# Patient Record
Sex: MALE | Employment: FULL TIME | URBAN - METROPOLITAN AREA
[De-identification: names, ages, dates, MRNs, and addresses within clinical notes are randomized per-mention and may not be internally consistent; named-entity substitution may affect disease eponyms.]

---

## 2018-01-22 ENCOUNTER — PATIENT MESSAGE (OUTPATIENT)
Dept: FAMILY MEDICINE CLINIC | Facility: CLINIC | Age: 48
End: 2018-01-22

## 2018-01-24 NOTE — TELEPHONE ENCOUNTER
CMW please advise on my chart message:     From: Cyril Wiggins  To:  Lio Nichole DO  Sent: 1/22/2018 10:43 AM CST  Subject: Non-Urgent Medical Question    I scheduled a checkup with Dr. Claritza Otto, which I could not get in until Mar. 1.      Aside from a

## 2018-01-25 ENCOUNTER — OFFICE VISIT (OUTPATIENT)
Dept: FAMILY MEDICINE CLINIC | Facility: CLINIC | Age: 48
End: 2018-01-25

## 2018-01-25 VITALS
TEMPERATURE: 98 F | DIASTOLIC BLOOD PRESSURE: 75 MMHG | HEART RATE: 56 BPM | WEIGHT: 201 LBS | SYSTOLIC BLOOD PRESSURE: 130 MMHG | BODY MASS INDEX: 25.8 KG/M2 | HEIGHT: 74 IN | RESPIRATION RATE: 16 BRPM

## 2018-01-25 DIAGNOSIS — Z00.00 ROUTINE PHYSICAL EXAMINATION: Primary | ICD-10-CM

## 2018-01-25 DIAGNOSIS — R42 VERTIGO: ICD-10-CM

## 2018-01-25 DIAGNOSIS — R06.83 SNORING: ICD-10-CM

## 2018-01-25 DIAGNOSIS — M21.612 BUNION OF LEFT FOOT: ICD-10-CM

## 2018-01-25 PROCEDURE — 99396 PREV VISIT EST AGE 40-64: CPT | Performed by: FAMILY MEDICINE

## 2018-01-25 PROCEDURE — 90715 TDAP VACCINE 7 YRS/> IM: CPT | Performed by: FAMILY MEDICINE

## 2018-01-25 PROCEDURE — 90471 IMMUNIZATION ADMIN: CPT | Performed by: FAMILY MEDICINE

## 2018-01-25 RX ORDER — CLONAZEPAM 0.5 MG/1
0.5 TABLET ORAL 2 TIMES DAILY PRN
COMMUNITY
End: 2019-06-27

## 2018-01-25 RX ORDER — ROSUVASTATIN CALCIUM 5 MG/1
5 TABLET, COATED ORAL
Refills: 3 | COMMUNITY
Start: 2018-01-12

## 2018-01-25 NOTE — PROGRESS NOTES
HPI:  52 yr old male who presents for physical. Overall, feeling well. Sees cardiologist for father's history of MI at 48. Started Crestor 5mg nightly. Last bloodwork was 8/17. Lipid panel was good as well as LFTs.       Pt was a smoker in the past.  Smok Ht 6' 2\" (1.88 m)   Wt 201 lb (91.2 kg)   BMI 25.81 kg/m²   Gen:  Well-nourished. No distress. HEENT: PERRLA, conjunctive clear. Keshav ear canals clear. Keshav TMs intact with good landmarks noted. Nares patent.   Oral mucous membrane moist.  Normal lips,

## 2019-06-27 ENCOUNTER — OFFICE VISIT (OUTPATIENT)
Dept: FAMILY MEDICINE CLINIC | Facility: CLINIC | Age: 49
End: 2019-06-27
Payer: COMMERCIAL

## 2019-06-27 VITALS
RESPIRATION RATE: 16 BRPM | HEIGHT: 74 IN | DIASTOLIC BLOOD PRESSURE: 77 MMHG | BODY MASS INDEX: 25.93 KG/M2 | HEART RATE: 56 BPM | TEMPERATURE: 98 F | SYSTOLIC BLOOD PRESSURE: 125 MMHG | WEIGHT: 202 LBS

## 2019-06-27 DIAGNOSIS — Z00.00 ROUTINE PHYSICAL EXAMINATION: Primary | ICD-10-CM

## 2019-06-27 DIAGNOSIS — Z12.11 SCREENING FOR COLON CANCER: ICD-10-CM

## 2019-06-27 PROCEDURE — 99396 PREV VISIT EST AGE 40-64: CPT | Performed by: FAMILY MEDICINE

## 2019-06-27 RX ORDER — LORAZEPAM 0.5 MG/1
0.5 TABLET ORAL EVERY 4 HOURS PRN
COMMUNITY

## 2019-06-27 NOTE — PROGRESS NOTES
HPI:  52 yr old male who presents for physical. Feeling well. Plays hockey twice a week. Works out other day. Follows with Dr. Roxy Stauffer for cardiology. Father had MI at 48. Had normal stress test. Takes Rosuvastatin twice per week.  Cholesterol is stab canals clear. Keshav TMs intact with good landmarks noted. Nares patent. Oral mucous membrane moist.  Normal lips, teeth, and gums. Oropharynx normal.  Neck supple. Good ROM. No LAD.   Thyroid normal.  CV:  Regular rate and rhythm; no murmurs  Lungs:  Cl

## 2019-08-22 ENCOUNTER — OFFICE VISIT (OUTPATIENT)
Dept: GASTROENTEROLOGY | Facility: CLINIC | Age: 49
End: 2019-08-22
Payer: COMMERCIAL

## 2019-08-22 ENCOUNTER — TELEPHONE (OUTPATIENT)
Dept: GASTROENTEROLOGY | Facility: CLINIC | Age: 49
End: 2019-08-22

## 2019-08-22 VITALS
HEIGHT: 74 IN | DIASTOLIC BLOOD PRESSURE: 73 MMHG | HEART RATE: 60 BPM | WEIGHT: 202 LBS | SYSTOLIC BLOOD PRESSURE: 120 MMHG | BODY MASS INDEX: 25.93 KG/M2

## 2019-08-22 DIAGNOSIS — Z12.11 COLON CANCER SCREENING: Primary | ICD-10-CM

## 2019-08-22 DIAGNOSIS — L29.0 ANAL PRURITUS: Primary | ICD-10-CM

## 2019-08-22 DIAGNOSIS — Z83.71 FAMILY HX COLONIC POLYPS: ICD-10-CM

## 2019-08-22 PROCEDURE — 99243 OFF/OP CNSLTJ NEW/EST LOW 30: CPT | Performed by: INTERNAL MEDICINE

## 2019-08-22 RX ORDER — FLUOCINOLONE ACETONIDE 0.11 MG/ML
OIL AURICULAR (OTIC)
COMMUNITY

## 2019-08-22 NOTE — PATIENT INSTRUCTIONS
1. Start with preparation H --- twice a day for 14 days  2. If preparation H doesn't work, start with Desitin ointment 1-2x a day for 14 days  3. Schedule colonoscopy with IV sedation (Screening, family hx of colon polyps)  4.  Suprep prescription given

## 2019-08-22 NOTE — TELEPHONE ENCOUNTER
Scheduled for:  Colonoscopy 41855  Provider Name: Dr Mauricio Seals  Date:  Wed 10/23/19  Location:  [facility]  Sedation: IV  Time:  7:30 am  Prep: split dose suprep  Meds/Allergies Reconciled?:  PCN  Diagnosis with codes:  FHCP Z83.71, colon cancer screening Z12.11  Was

## 2019-08-27 PROBLEM — L29.0 ANAL PRURITUS: Status: ACTIVE | Noted: 2019-08-27

## 2019-08-27 NOTE — H&P
[address] [facility] [address] Gastroenterology                                                                                                  Clinic History and Physical     Pa Take as directed Disp: 1 Bottle Rfl: 0   LORazepam 0.5 MG Oral Tab Take 0.5 mg by mouth every 4 (four) hours as needed for Anxiety. Disp:  Rfl:    Rosuvastatin Calcium 5 MG Oral Tab Take 5 mg by mouth twice a week.  Disp:  Rfl: 3   Cholecalciferol (VITAMIN cancer screening evaluation. NO anemia noted on lab work. # Average Risk screening: patient is considered average risk for colon cancer (NO family hx of colon cancer) and it is appropriate to proceed with screening colonoscopy.  Patient is currently as with intervention [i.e. polypectomy, stent placement, etc.] as indicated. Orders This Visit:  No orders of the defined types were placed in this encounter.       Meds This Visit:  Requested Prescriptions     Signed Prescriptions Disp Refills   • Liane Beal

## 2019-10-23 ENCOUNTER — HOSPITAL ENCOUNTER (OUTPATIENT)
Facility: HOSPITAL | Age: 49
Setting detail: HOSPITAL OUTPATIENT SURGERY
Discharge: HOME | End: 2019-10-23
Attending: INTERNAL MEDICINE | Admitting: INTERNAL MEDICINE
Payer: COMMERCIAL

## 2019-10-23 VITALS
OXYGEN SATURATION: 99 % | SYSTOLIC BLOOD PRESSURE: 125 MMHG | HEIGHT: 74 IN | DIASTOLIC BLOOD PRESSURE: 80 MMHG | BODY MASS INDEX: 25.41 KG/M2 | HEART RATE: 67 BPM | RESPIRATION RATE: 12 BRPM | WEIGHT: 198 LBS

## 2019-10-23 DIAGNOSIS — Z83.71 FAMILY HX COLONIC POLYPS: ICD-10-CM

## 2019-10-23 DIAGNOSIS — Z12.11 COLON CANCER SCREENING: ICD-10-CM

## 2019-10-23 PROCEDURE — 0DBL8ZX EXCISION OF TRANSVERSE COLON, VIA NATURAL OR ARTIFICIAL OPENING ENDOSCOPIC, DIAGNOSTIC: ICD-10-PCS | Performed by: INTERNAL MEDICINE

## 2019-10-23 PROCEDURE — 45385 COLONOSCOPY W/LESION REMOVAL: CPT | Performed by: INTERNAL MEDICINE

## 2019-10-23 PROCEDURE — G0500 MOD SEDAT ENDO SERVICE >5YRS: HCPCS | Performed by: INTERNAL MEDICINE

## 2019-10-23 RX ORDER — MIDAZOLAM HYDROCHLORIDE 1 MG/ML
1 INJECTION INTRAMUSCULAR; INTRAVENOUS EVERY 5 MIN PRN
Status: DISCONTINUED | OUTPATIENT
Start: 2019-10-23 | End: 2019-10-23

## 2019-10-23 RX ORDER — SODIUM CHLORIDE, SODIUM LACTATE, POTASSIUM CHLORIDE, CALCIUM CHLORIDE 600; 310; 30; 20 MG/100ML; MG/100ML; MG/100ML; MG/100ML
INJECTION, SOLUTION INTRAVENOUS CONTINUOUS
Status: DISCONTINUED | OUTPATIENT
Start: 2019-10-23 | End: 2019-10-23

## 2019-10-23 RX ORDER — SODIUM CHLORIDE 0.9 % (FLUSH) 0.9 %
10 SYRINGE (ML) INJECTION AS NEEDED
Status: DISCONTINUED | OUTPATIENT
Start: 2019-10-23 | End: 2019-10-23

## 2019-10-23 RX ORDER — MIDAZOLAM HYDROCHLORIDE 1 MG/ML
INJECTION INTRAMUSCULAR; INTRAVENOUS
Status: DISCONTINUED | OUTPATIENT
Start: 2019-10-23 | End: 2019-10-23

## 2019-10-23 NOTE — OPERATIVE REPORT
COLONOSCOPY REPORT    Malcolm Angelucci     3/11/1970 Age 52year old   PCP Calderon Estrada DO Endoscopist Isabel Aponte MD     Date of procedure: 10/23/19    Procedure: Colonoscopy w/cold snare polypectomy    Pre-operative diagnosis: Screening retrieved. B. 15 mm polyp in the proximal transverse colon; flat morphology; cold snare polypectomy and retrieved. C. 10 mm polyp in the proximal transverse colon; flat morphology; cold snare polypectomy and retrieved.   >>Visualization was diffic

## 2019-10-23 NOTE — H&P
History & Physical Examination    Patient Name: Simone Reaves  MRN: [id number]  [facility]: [id number]  YOB: 1970    Diagnosis: screening for colon cancer    LORazepam 0.5 MG Oral Tab, Take 0.5 mg by mouth every 4 (four) hours as needed for Anxiety Normal Check if Physical Exam is Normal If not normal, please explain:   HEENT Emma.Blowers ] [ Harley Turcios  Emma.Blowers ] [ X]    HEART Emma.Blowers ] [ Doyle Lloyd Emma.Blowers ] [ Maryann Doradoted Emma.Blowers ] [ Elvira Mancia Emma.Blowers ] [ X]    OTHER        I have discussed the risks and benefits and alte

## 2019-10-30 ENCOUNTER — TELEPHONE (OUTPATIENT)
Dept: GASTROENTEROLOGY | Facility: CLINIC | Age: 49
End: 2019-10-30

## 2019-10-30 NOTE — TELEPHONE ENCOUNTER
I mailed out colonoscopy results letter to pt  Updated health maintenance  Entered into 1 y r CLN recall  Recall colon in 1 year by .   Colon done 10/23/19    GI staff: please place recall for colonoscopy in 1 year

## 2020-08-25 ENCOUNTER — TELEPHONE (OUTPATIENT)
Dept: GASTROENTEROLOGY | Facility: CLINIC | Age: 50
End: 2020-08-25

## 2020-08-25 NOTE — TELEPHONE ENCOUNTER
----- Message from Guerline Regalado CMA sent at 10/30/2019 10:55 AM CDT -----  Regardin yr CLN recall w/Dr Mauricio Seals  Recall colon in 1 year by .   Colon done 10/23/19 w/Dr Mauricio Seals    GI staff: please place recall for colonoscopy in 1 year

## 2020-10-01 ENCOUNTER — TELEPHONE (OUTPATIENT)
Dept: GASTROENTEROLOGY | Facility: CLINIC | Age: 50
End: 2020-10-01

## 2020-10-01 DIAGNOSIS — Z86.010 PERSONAL HISTORY OF COLONIC POLYPS: ICD-10-CM

## 2020-10-01 DIAGNOSIS — Z12.11 COLON CANCER SCREENING: Primary | ICD-10-CM

## 2020-10-01 NOTE — TELEPHONE ENCOUNTER
Last Procedure, Date, MD:  MARY with Dr. Alma Mitchell on 10/23/2019  Last Diagnosis:  Post-operative diagnosis: Polyps and internal hemorrhoids  Recalled for (mth/yrs): 1 yr-10/23/2020  Sedation used previously: IVCS   Last Prep Used (if known):  Split dose suprep

## 2020-10-02 NOTE — TELEPHONE ENCOUNTER
Please schedule: CLN with MAC or IV sedation. Please pend split Trilyte bowel prep.      Diagnosis: screening, hx of polys    Medication adjustments:  Day before procedure, hold: none  Day of procedure, hold: none

## 2020-10-02 NOTE — TELEPHONE ENCOUNTER
Patient is calling in to schedule the procedure. He is trying to set up the procedure as soon as possible.  Please advise thank you

## 2020-10-05 RX ORDER — SODIUM, POTASSIUM,MAG SULFATES 17.5-3.13G
SOLUTION, RECONSTITUTED, ORAL ORAL
Qty: 1 BOTTLE | Refills: 0 | Status: ON HOLD | OUTPATIENT
Start: 2020-10-05 | End: 2020-12-02

## 2020-10-05 NOTE — TELEPHONE ENCOUNTER
Scheduled for:  Colonoscopy - 66785  Provider Name:  Dr. Ramsey Prabhakar  Date:  12/2/20  Location:  [facility]  Sedation:  MAC  Time:  8:15 am (pt is aware to arrive at 7:15 am)  Prep:  Suprep, Prep instructions were given to pt over the phone, pt verbalized understanding

## 2020-10-05 NOTE — TELEPHONE ENCOUNTER
Dr. Mick Mcintosh - please place order for pt's Suprep, that is the prep he would prefer. Thank you!

## 2020-11-29 ENCOUNTER — APPOINTMENT (OUTPATIENT)
Dept: LAB | Facility: HOSPITAL | Age: 50
End: 2020-11-29
Attending: INTERNAL MEDICINE
Payer: COMMERCIAL

## 2020-11-29 DIAGNOSIS — Z01.818 PRE-OP TESTING: ICD-10-CM

## 2020-12-02 ENCOUNTER — ANESTHESIA EVENT (OUTPATIENT)
Dept: ENDOSCOPY | Facility: HOSPITAL | Age: 50
End: 2020-12-02
Payer: COMMERCIAL

## 2020-12-02 ENCOUNTER — ANESTHESIA (OUTPATIENT)
Dept: ENDOSCOPY | Facility: HOSPITAL | Age: 50
End: 2020-12-02
Payer: COMMERCIAL

## 2020-12-02 ENCOUNTER — HOSPITAL ENCOUNTER (OUTPATIENT)
Facility: HOSPITAL | Age: 50
Setting detail: HOSPITAL OUTPATIENT SURGERY
Discharge: HOME | End: 2020-12-02
Attending: INTERNAL MEDICINE | Admitting: INTERNAL MEDICINE
Payer: COMMERCIAL

## 2020-12-02 VITALS
WEIGHT: 198 LBS | TEMPERATURE: 98 F | BODY MASS INDEX: 25.41 KG/M2 | SYSTOLIC BLOOD PRESSURE: 131 MMHG | RESPIRATION RATE: 16 BRPM | HEIGHT: 74 IN | DIASTOLIC BLOOD PRESSURE: 82 MMHG | OXYGEN SATURATION: 100 % | HEART RATE: 65 BPM

## 2020-12-02 DIAGNOSIS — Z01.818 PRE-OP TESTING: Primary | ICD-10-CM

## 2020-12-02 DIAGNOSIS — Z12.11 COLON CANCER SCREENING: ICD-10-CM

## 2020-12-02 DIAGNOSIS — Z86.010 PERSONAL HISTORY OF COLONIC POLYPS: ICD-10-CM

## 2020-12-02 PROCEDURE — 0DBL8ZX EXCISION OF TRANSVERSE COLON, VIA NATURAL OR ARTIFICIAL OPENING ENDOSCOPIC, DIAGNOSTIC: ICD-10-PCS | Performed by: INTERNAL MEDICINE

## 2020-12-02 PROCEDURE — 0DBK8ZX EXCISION OF ASCENDING COLON, VIA NATURAL OR ARTIFICIAL OPENING ENDOSCOPIC, DIAGNOSTIC: ICD-10-PCS | Performed by: INTERNAL MEDICINE

## 2020-12-02 PROCEDURE — 45385 COLONOSCOPY W/LESION REMOVAL: CPT | Performed by: INTERNAL MEDICINE

## 2020-12-02 RX ORDER — NALOXONE HYDROCHLORIDE 0.4 MG/ML
80 INJECTION, SOLUTION INTRAMUSCULAR; INTRAVENOUS; SUBCUTANEOUS AS NEEDED
Status: CANCELLED | OUTPATIENT
Start: 2020-12-02 | End: 2020-12-02

## 2020-12-02 RX ORDER — SODIUM CHLORIDE, SODIUM LACTATE, POTASSIUM CHLORIDE, CALCIUM CHLORIDE 600; 310; 30; 20 MG/100ML; MG/100ML; MG/100ML; MG/100ML
INJECTION, SOLUTION INTRAVENOUS CONTINUOUS
Status: CANCELLED | OUTPATIENT
Start: 2020-12-02

## 2020-12-02 RX ORDER — SODIUM CHLORIDE, SODIUM LACTATE, POTASSIUM CHLORIDE, CALCIUM CHLORIDE 600; 310; 30; 20 MG/100ML; MG/100ML; MG/100ML; MG/100ML
INJECTION, SOLUTION INTRAVENOUS CONTINUOUS
Status: DISCONTINUED | OUTPATIENT
Start: 2020-12-02 | End: 2020-12-02

## 2020-12-02 RX ORDER — LIDOCAINE HYDROCHLORIDE 10 MG/ML
INJECTION, SOLUTION EPIDURAL; INFILTRATION; INTRACAUDAL; PERINEURAL AS NEEDED
Status: DISCONTINUED | OUTPATIENT
Start: 2020-12-02 | End: 2020-12-02 | Stop reason: SURG

## 2020-12-02 RX ADMIN — LIDOCAINE HYDROCHLORIDE 50 MG: 10 INJECTION, SOLUTION EPIDURAL; INFILTRATION; INTRACAUDAL; PERINEURAL at 08:14:00

## 2020-12-02 RX ADMIN — SODIUM CHLORIDE, SODIUM LACTATE, POTASSIUM CHLORIDE, CALCIUM CHLORIDE: 600; 310; 30; 20 INJECTION, SOLUTION INTRAVENOUS at 08:09:00

## 2020-12-02 RX ADMIN — SODIUM CHLORIDE, SODIUM LACTATE, POTASSIUM CHLORIDE, CALCIUM CHLORIDE: 600; 310; 30; 20 INJECTION, SOLUTION INTRAVENOUS at 08:58:00

## 2020-12-02 NOTE — H&P
History & Physical Examination    Patient Name: Marilyn Govea  MRN: [id number]  CSN: [id number]  YOB: 1970    Diagnosis: screening for colon cancer      •  Fluocinolone Acetonide 0.01 % Otic Oil, fluocinolone acetonide oil 0.01 % ear drop patient/family. They understand and agree to proceed with plan of care. I have reviewed the History and Physical done within the last 30 days. Any changes noted above.     Teo Momin, [address] [place] [address] - Gastroenterology  12/2/2020  8:14 AM

## 2020-12-02 NOTE — ANESTHESIA POSTPROCEDURE EVALUATION
Patient: Diane Ramirez    Procedure Summary     Date: 12/02/20 Room / Location: [facility] ENDOSCOPY 01 / [facility] ENDOSCOPY    Anesthesia Start: 0809 Anesthesia Stop: 3513    Procedure: COLONOSCOPY (N/A ) Diagnosis:       Colon cancer screening      Personal hist

## 2020-12-02 NOTE — OPERATIVE REPORT
COLONOSCOPY REPORT    Tong Montero     3/11/1970 Age 48year old   PCP Héctor Murdock DO Endoscopist Isabel Gamez MD     Date of procedure: 20    Procedure: Colonoscopy w/cold snare polypectomy    Pre-operative diagnosis: Screening visualized mucosa appeared normal.    4. A retroflexed view of the rectum revealed small internal hemorrhoids. 5. The colonic mucosa throughout the colon showed normal vascular pattern, without evidence of angioectasias or inflammation. Spastic colon.

## 2020-12-02 NOTE — ANESTHESIA PREPROCEDURE EVALUATION
Anesthesia PreOp Note    HPI:     Ramos Wright is a 48year old male who presents for preoperative consultation requested by: Gamaliel Hurtado MD    Date of Surgery: 12/2/2020    Procedure(s):  COLONOSCOPY  Indication: Colon cancer screening , Persona on file      Number of children: Not on file      Years of education: Not on file      Highest education level: Not on file    Occupational History      Not on file    Social Needs      Financial resource strain: Not on file      Food insecurity        Wor file      Available pre-op labs reviewed. Vital Signs: There is no height or weight on file to calculate BMI.   vitals were not taken for this visit. There were no vitals filed for this visit.      Anesthesia Evaluation     Patient summary re

## 2020-12-15 ENCOUNTER — TELEPHONE (OUTPATIENT)
Dept: GASTROENTEROLOGY | Facility: CLINIC | Age: 50
End: 2020-12-15

## 2020-12-15 NOTE — TELEPHONE ENCOUNTER
I mailed out colonoscopy results letter to pt  Updated health maintenance  Entered into 3 yr CLN recall  Recall colon in 3 years per.  Colon done 12/02/2020    Natalia Cao MD  [facility] Clinical Staff             GI staff: please place recall for colonos

## 2023-02-11 ENCOUNTER — OFFICE VISIT (OUTPATIENT)
Dept: FAMILY MEDICINE CLINIC | Facility: CLINIC | Age: 53
End: 2023-02-11

## 2023-02-11 VITALS
BODY MASS INDEX: 24.51 KG/M2 | HEIGHT: 74 IN | HEART RATE: 79 BPM | DIASTOLIC BLOOD PRESSURE: 75 MMHG | WEIGHT: 191 LBS | TEMPERATURE: 98 F | SYSTOLIC BLOOD PRESSURE: 114 MMHG

## 2023-02-11 DIAGNOSIS — M79.645 PAIN OF FINGER OF LEFT HAND: ICD-10-CM

## 2023-02-11 DIAGNOSIS — F41.9 ANXIETY: ICD-10-CM

## 2023-02-11 DIAGNOSIS — H61.23 BILATERAL IMPACTED CERUMEN: ICD-10-CM

## 2023-02-11 DIAGNOSIS — Z00.00 ROUTINE PHYSICAL EXAMINATION: Primary | ICD-10-CM

## 2023-02-11 RX ORDER — ESCITALOPRAM OXALATE 5 MG/1
5 TABLET ORAL DAILY
COMMUNITY

## 2023-02-11 RX ORDER — FLUOCINOLONE ACETONIDE 0.11 MG/ML
1 OIL AURICULAR (OTIC) 2 TIMES DAILY
Qty: 1 EACH | Refills: 3 | Status: SHIPPED | OUTPATIENT
Start: 2023-02-11

## 2023-02-20 ENCOUNTER — OFFICE VISIT (OUTPATIENT)
Dept: OTOLARYNGOLOGY | Facility: CLINIC | Age: 53
End: 2023-02-20

## 2023-02-20 DIAGNOSIS — H61.23 BILATERAL IMPACTED CERUMEN: Primary | ICD-10-CM

## 2023-02-20 PROCEDURE — 99202 OFFICE O/P NEW SF 15 MIN: CPT | Performed by: OTOLARYNGOLOGY

## 2023-06-01 ENCOUNTER — PATIENT MESSAGE (OUTPATIENT)
Dept: FAMILY MEDICINE CLINIC | Facility: CLINIC | Age: 53
End: 2023-06-01

## 2023-06-01 DIAGNOSIS — Z12.5 PROSTATE CANCER SCREENING: Primary | ICD-10-CM

## 2023-06-02 NOTE — TELEPHONE ENCOUNTER
Dr. Kasandra Mcnamara, please see patient's MyChart message below and advise on PSA screening (pended for review) and any other recommended lab tests. Thank you. Per progress note by Dr. Kasandra Mcnamara 02/11/23:  \"A/P:  Routine physical examination  (primary encounter diagnosis)  Healthy. Exercising regularly. Follows with cardiologist.  Had normal stress and echo this year. We will plan to see cardiology again in the fall. May need lab work before then. We will reach out to me to order lab work for cardiologist if he needs it. Patient is also considering whether or not he wants to get PSA done. Will let me know in the fall if he chooses to. Colonoscopy due in December. Will schedule appointment. \"

## 2023-06-15 ENCOUNTER — LAB ENCOUNTER (OUTPATIENT)
Dept: LAB | Age: 53
End: 2023-06-15
Attending: FAMILY MEDICINE
Payer: COMMERCIAL

## 2023-06-15 DIAGNOSIS — Z12.5 PROSTATE CANCER SCREENING: ICD-10-CM

## 2023-06-15 LAB — COMPLEXED PSA SERPL-MCNC: 0.26 NG/ML (ref ?–4)

## 2023-06-15 PROCEDURE — 36415 COLL VENOUS BLD VENIPUNCTURE: CPT

## 2023-07-21 ENCOUNTER — TELEPHONE (OUTPATIENT)
Facility: CLINIC | Age: 53
End: 2023-07-21

## 2023-07-21 DIAGNOSIS — Z86.010 HISTORY OF COLONIC POLYPS: Primary | ICD-10-CM

## 2023-07-21 NOTE — TELEPHONE ENCOUNTER
Last Procedure, Date, MD: Colonoscopy, 12/2/20, Dr. Dionne Greenfield   Last Diagnosis: serrated adenoma   Recalled (mth/yrs): 3 years  Sedation Used Previously: MAC    Last Prep Used (if known): suprep   Quality Of Prep (if known): good  Anticoagulants: n/a  Diuretics: n/a  Diabetic Med's (PO/Injectables): n/a  Weight loss Med's: n/a  Iron/Herbal/Multivitamin Supplements (RX/OTC): multivitamin otc  Marijuana/Vaping/CBD: n/a  Height & Weight: 6'2\"/191lbs  BMI: 24.51  Hx of Cardiac/CVA Issues/(MI/Stroke): n/a  Devices Pacemaker/Defibrillator/Stents: n/a  Respiratory Issues/Oxygen Use/HOLLY/COPD: sleep apnea, uses cpap  Issues w/ Anesthesia: n/a    Symptoms (Y/N): N  Symptoms Details: n/a    Special Comments/Notes: verified allergies, medications, pharmacy    Please advise on orders and prep. Thank you!

## 2023-07-21 NOTE — TELEPHONE ENCOUNTER
COLONOSCOPY REPORT           Merly Fall      3/11/1970 Age 48year old   PCP Moni Presley DO Endoscopist Isabel Bee MD      Date of procedure: 20     Procedure: Colonoscopy w/cold snare polypectomy     Pre-operative diagnosis: Screening     Post-operative diagnosis: Colon polyps x2, internal hemorrhoids     Medications: MAC + Glucagon 1 mg IV. Withdrawal time: 38 minutes     Procedure:  Informed consent was obtained from the patient after the risks of the procedure were discussed, including but not limited to bleeding, perforation, aspiration, infection, or possibility of a missed lesion. After discussions of the risks/benefits and alternatives to this procedure, as well as the planned sedation, the patient was placed in the left lateral decubitus position and begun on continuous blood pressure pulse oximetry and EKG monitoring and this was maintained throughout the procedure. Once an adequate level of sedation was obtained a digital rectal exam was completed. Then the lubricated tip of the [id number] diagnostic video colonoscope was inserted and advanced without difficulty to the cecum using the CO2 insufflation technique. The cecum was identified by localizing the trifold, the appendix and the ileocecal valve. Withdrawal was begun with thorough washing and careful examination of the colonic walls and folds. A routine second examination of the cecum/ascending colon was performed. Photodocumentation was obtained. The bowel prep was good. Views of the colon were good with washing. I then carefully withdrew the instrument from the patient who tolerated the procedure well. Complications: none. Findings:   1. Two polyp(s) noted as follows:      A. 5 mm polyp in the ascending colon; flat morphology; cold snare polypectomy and retrieved. B. 5 mm polyp in the transverse colon; flat morphology; cold snare polypectomy and retrieved. 2. Diverticulosis: none.      3. Terminal ileum: the visualized mucosa appeared normal.     4. A retroflexed view of the rectum revealed small internal hemorrhoids. 5. The colonic mucosa throughout the colon showed normal vascular pattern, without evidence of angioectasias or inflammation. Spastic colon. 6. JAXON: normal rectal tone, no masses palpated. Impression:   Two small colon polyps removed. Small internal hemorrhoids. Spastic colon, s/p glucagon 1mg IV. Recommend:  Await pathology. Repeat CLN in 3 years (continue use of adult c-scope). If new signs or symptoms develop, colonoscopy may need to be repeated sooner. High fiber diet. Monitor for blood in the stool. If having more than just tinge of blood, call office or go to the ER.     >>>If tissue was obtained and you have not received your pathology results either by phone or letter within 2 weeks, please call our office at .      Specimens: colon      Blood loss: <1 ml   =========================================

## 2023-07-25 RX ORDER — POLYETHYLENE GLYCOL 3350, SODIUM CHLORIDE, SODIUM BICARBONATE, POTASSIUM CHLORIDE 420; 11.2; 5.72; 1.48 G/4L; G/4L; G/4L; G/4L
POWDER, FOR SOLUTION ORAL
Qty: 4000 ML | Refills: 0 | Status: SHIPPED | OUTPATIENT
Start: 2023-07-25

## 2023-07-25 NOTE — TELEPHONE ENCOUNTER
Scheduled for:  Colonoscopy [facility]  Provider Name:  Dr. Mario England   Date:  12/5/2023  Location:    [facility]  Sedation:  MAC  Time:  8:15 AM  (patient is aware arrival time is 7:15)  Prep:  trilyte   Meds/Allergies Reconciled?:  Physician reviewed   Diagnosis with codes:  HX of colon polyps  Z86.010   Was patient informed to call insurance with codes (Y/N):  Yes, I confirmed [facility] with the patient. Referral sent?:  Referral was sent at the time of electronic surgical scheduling. [facility] or [facility] [address] [place] [address] notified?:  I sent an electronic request to Endo Scheduling and received a confirmation today. Medication Orders: This patient verbally confirmed that he is not taking:   Iron, blood thinners, BP meds, and is not diabetic   Not latex allergy, Not PCN allergy and does not have a pacemaker  Pt is aware to NOT take iron pills, herbal meds and diet supplements for 7 days before exam. Also to NOT take any form of alcohol, recreational drugs and any forms of ED meds 24 hours before exam.   Misc Orders:  I discussed the prep instructions with the patient which he verbally understood and is aware that I will mychart the instructions today.        Further instructions given by staff:

## 2023-07-25 NOTE — TELEPHONE ENCOUNTER
Scheduling:  cln w/ [facility]ales w/ Dr. Guillermina Xavier (needed glucagon during last procdure for spastic colon)  Dx: colon polyps  trilyte split dose sent e-scribe

## 2023-09-26 ENCOUNTER — OFFICE VISIT (OUTPATIENT)
Dept: FAMILY MEDICINE CLINIC | Facility: CLINIC | Age: 53
End: 2023-09-26

## 2023-09-26 VITALS
WEIGHT: 195 LBS | BODY MASS INDEX: 25 KG/M2 | TEMPERATURE: 99 F | DIASTOLIC BLOOD PRESSURE: 72 MMHG | SYSTOLIC BLOOD PRESSURE: 111 MMHG | HEART RATE: 62 BPM | RESPIRATION RATE: 16 BRPM

## 2023-09-26 DIAGNOSIS — G57.01 PIRIFORMIS SYNDROME OF RIGHT SIDE: Primary | ICD-10-CM

## 2023-09-26 PROCEDURE — 90471 IMMUNIZATION ADMIN: CPT | Performed by: FAMILY MEDICINE

## 2023-09-26 PROCEDURE — 99213 OFFICE O/P EST LOW 20 MIN: CPT | Performed by: FAMILY MEDICINE

## 2023-09-26 PROCEDURE — 3074F SYST BP LT 130 MM HG: CPT | Performed by: FAMILY MEDICINE

## 2023-09-26 PROCEDURE — 90686 IIV4 VACC NO PRSV 0.5 ML IM: CPT | Performed by: FAMILY MEDICINE

## 2023-09-26 PROCEDURE — 3078F DIAST BP <80 MM HG: CPT | Performed by: FAMILY MEDICINE

## 2023-11-27 ENCOUNTER — TELEPHONE (OUTPATIENT)
Facility: CLINIC | Age: 53
End: 2023-11-27

## 2023-11-27 DIAGNOSIS — Z86.010 PERSONAL HISTORY OF COLONIC POLYPS: Primary | ICD-10-CM

## 2023-11-27 NOTE — TELEPHONE ENCOUNTER
I spoke with Felipe Madera.  He states he tested positive for Covid today. Wife tested positive also. The only symptom he has is sneezing. Denies fever,sore throat,body aches,cough or difficulty breathing. Patient is scheduled for a colonoscopy on 12/05 and prefers not to have to reschedule since he had to wait a long time for this appointment date. Please advise. Thank you.

## 2023-11-27 NOTE — TELEPHONE ENCOUNTER
Pt calling to inform MD that he tested positive for Covid today. He is scheduled for a CLN on 12/5. He wants to make sure that it is OK to still have CLN on that day. He prefers not to postpone.   Please call

## 2023-11-28 NOTE — TELEPHONE ENCOUNTER
Call received from Hailey in PAT/endo stating per policy patient will need to be scheduled 2 weeks out from positive covid test date. Patient was made aware by [facility]. Removed from epic. Surgical case change request made. GI schedulers--    Please contact patient to reschedule at least 2 weeks out from 11/27/23.       Sredehar De, APRN       7/25/23 12:33 PM  Note  Scheduling:  cln w/ mac w/ Dr. June Vasquez (needed glucagon during last procdure for spastic colon)  Dx: colon polyps  trilyte split dose sent e-scribe

## 2023-11-30 NOTE — TELEPHONE ENCOUNTER
Scheduled for:  Colonoscopy [facility]   Provider Name:  Dr. Leonardo Emery   Date:  4/9/2024  Location:    [facility]  Sedation:  Mac  Time:  9:15 (patient is aware arrival time is 8:15)  Prep:  trilyte   Meds/Allergies Reconciled?:  Physician reviewed   Diagnosis with codes:  DX of colon polyps Z86.010  Was patient informed to call insurance with codes (Y/N):  Yes, I confirmed [facility] with the patient. Referral sent?: Referral was sent at the time of electronic surgical scheduling. [address] [facility] or [facility]  notified?:  I sent an electronic request to Endo Scheduling and received a confirmation today. Medication Orders: This patient verbally confirmed that he is not taking:   Iron, blood thinners, BP meds, and is not diabetic   Not latex allergy, Not PCN allergy and does not have a pacemaker     Misc Orders:     I discussed the prep instructions with the patient which he verbally understood and is aware that I will mychart the instructions today.     Further instructions given by staff:     needed glucagon during last procdure for spastic colon

## 2024-04-09 ENCOUNTER — ANESTHESIA EVENT (OUTPATIENT)
Dept: ENDOSCOPY | Facility: HOSPITAL | Age: 54
End: 2024-04-09
Payer: COMMERCIAL

## 2024-04-09 ENCOUNTER — ANESTHESIA (OUTPATIENT)
Dept: ENDOSCOPY | Facility: HOSPITAL | Age: 54
End: 2024-04-09
Payer: COMMERCIAL

## 2024-04-09 ENCOUNTER — HOSPITAL ENCOUNTER (OUTPATIENT)
Facility: HOSPITAL | Age: 54
Setting detail: HOSPITAL OUTPATIENT SURGERY
Discharge: HOME | End: 2024-04-09
Attending: INTERNAL MEDICINE | Admitting: INTERNAL MEDICINE
Payer: COMMERCIAL

## 2024-04-09 VITALS
OXYGEN SATURATION: 96 % | RESPIRATION RATE: 15 BRPM | HEART RATE: 55 BPM | HEIGHT: 74 IN | DIASTOLIC BLOOD PRESSURE: 82 MMHG | WEIGHT: 198 LBS | BODY MASS INDEX: 25.41 KG/M2 | SYSTOLIC BLOOD PRESSURE: 113 MMHG

## 2024-04-09 DIAGNOSIS — Z86.010 PERSONAL HISTORY OF COLONIC POLYPS: ICD-10-CM

## 2024-04-09 PROCEDURE — 45385 COLONOSCOPY W/LESION REMOVAL: CPT | Performed by: INTERNAL MEDICINE

## 2024-04-09 PROCEDURE — 0DBK8ZX EXCISION OF ASCENDING COLON, VIA NATURAL OR ARTIFICIAL OPENING ENDOSCOPIC, DIAGNOSTIC: ICD-10-PCS | Performed by: INTERNAL MEDICINE

## 2024-04-09 RX ORDER — LIDOCAINE HYDROCHLORIDE 10 MG/ML
INJECTION, SOLUTION EPIDURAL; INFILTRATION; INTRACAUDAL; PERINEURAL AS NEEDED
Status: DISCONTINUED | OUTPATIENT
Start: 2024-04-09 | End: 2024-04-09 | Stop reason: SURG

## 2024-04-09 RX ORDER — SODIUM CHLORIDE, SODIUM LACTATE, POTASSIUM CHLORIDE, CALCIUM CHLORIDE 600; 310; 30; 20 MG/100ML; MG/100ML; MG/100ML; MG/100ML
INJECTION, SOLUTION INTRAVENOUS CONTINUOUS
OUTPATIENT
Start: 2024-04-09

## 2024-04-09 RX ORDER — NALOXONE HYDROCHLORIDE 0.4 MG/ML
0.08 INJECTION, SOLUTION INTRAMUSCULAR; INTRAVENOUS; SUBCUTANEOUS ONCE AS NEEDED
OUTPATIENT
Start: 2024-04-09 | End: 2024-04-09

## 2024-04-09 RX ORDER — SODIUM CHLORIDE, SODIUM LACTATE, POTASSIUM CHLORIDE, CALCIUM CHLORIDE 600; 310; 30; 20 MG/100ML; MG/100ML; MG/100ML; MG/100ML
INJECTION, SOLUTION INTRAVENOUS CONTINUOUS
Status: DISCONTINUED | OUTPATIENT
Start: 2024-04-09 | End: 2024-04-09

## 2024-04-09 RX ADMIN — SODIUM CHLORIDE, SODIUM LACTATE, POTASSIUM CHLORIDE, CALCIUM CHLORIDE: 600; 310; 30; 20 INJECTION, SOLUTION INTRAVENOUS at 09:32:00

## 2024-04-09 RX ADMIN — LIDOCAINE HYDROCHLORIDE 50 MG: 10 INJECTION, SOLUTION EPIDURAL; INFILTRATION; INTRACAUDAL; PERINEURAL at 09:11:00

## 2024-04-09 RX ADMIN — SODIUM CHLORIDE, SODIUM LACTATE, POTASSIUM CHLORIDE, CALCIUM CHLORIDE: 600; 310; 30; 20 INJECTION, SOLUTION INTRAVENOUS at 09:09:00

## 2024-04-09 NOTE — OPERATIVE REPORT
COLONOSCOPY REPORT    Frankie Valencia     3/11/1970 Age 54 year old   PCP Colleen Weiler, DO Endoscopist Isabel Fraga MD     Date of procedure: 24    Procedure: Colonoscopy w/cold snare polypectomy    Pre-operative diagnosis: Screening, hx of polyps    Post-operative diagnosis: colon polyp x1, internal hemorrhoids    Medications: MAC    Withdrawal time: 13 minutes    Procedure:  Informed consent was obtained from the patient after the risks of the procedure were discussed, including but not limited to bleeding, perforation, aspiration, infection, or possibility of a missed lesion. After discussions of the risks/benefits and alternatives to this procedure, as well as the planned sedation, the patient was placed in the left lateral decubitus position and begun on continuous blood pressure pulse oximetry and EKG monitoring and this was maintained throughout the procedure. Once an adequate level of sedation was obtained a digital rectal exam was completed. Then the lubricated tip of the [id number] diagnostic video colonoscope was inserted and advanced without difficulty to the cecum using the CO2 insufflation technique. The cecum was identified by localizing the trifold, the appendix and the ileocecal valve. Withdrawal was begun with thorough washing and careful examination of the colonic walls and folds. A routine second examination of the cecum/ascending colon was performed. Photodocumentation was obtained. The bowel prep was good. Views of the colon were good with washing. I then carefully withdrew the instrument from the patient who tolerated the procedure well.     Complications: none.    Findings:   1. One polyp(s) noted as follows:      A. 9 mm polyp in the distal ascending colon; flat morphology; cold snare polypectomy and retrieved.    2. Diverticulosis: none.    3. Terminal ileum: the visualized mucosa appeared normal.    4. The colonic mucosa throughout the colon showed normal  vascular pattern, without evidence of angioectasias or inflammation.     5. A retroflexed view of the rectum revealed small internal hemorrhoids.    6. JAXON: normal rectal tone, no masses palpated.     Impression:   One small colon polyp removed.  Small internal hemorrhoids.  Spastic colon.    Recommend:  Await pathology. The interval for the next colonoscopy will be determined after reviewing pathology - likely 5 years. If new signs or symptoms develop, colonoscopy may need to be repeated sooner.   High fiber diet.  Monitor for blood in the stool. If having more than just tinge of blood, call office or go to the ER.  Avoid red meat, increase fruit/vegetable intake.    >>>If tissue was obtained and you have not received your pathology results either by phone or letter within 2 weeks, please call our office at [phone].    Specimens: colon   Blood loss: <1 ml      ----------------------------------------------------------------------------------------------------------------------------------    INTERVAL FOR COLONOSCOPY:   - If there is no family history of colon cancer and no colon polyps identified in an adequately prepped colon - colonoscopy should be repeated in 10 years. Various factors should be considered regarding repeat colonoscopy - including co-morbid conditions, ability to tolerate procedure with advanced age, and desire for repeat testing.   - If no colon polyps were identified and a positive family history of colon cancer - the colonoscopy should be repeated in 5 years.   - If colon polyps were removed, the colonoscopy should be repeated sooner depending on size/type/location of polyp.

## 2024-04-09 NOTE — ANESTHESIA PREPROCEDURE EVALUATION
Anesthesia PreOp Note    HPI:     Frankie Valencia is a 54 year old male who presents for preoperative consultation requested by: RADHA Fraga MD    Date of Surgery: 4/9/2024    Procedure(s):  COLONOSCOPY  Indication: Personal history of colonic polyps    Relevant Problems   No relevant active problems       NPO:  Last Liquid Consumption Date: 04/09/24  Last Liquid Consumption Time: 0600  Last Solid Consumption Date: 04/08/24  Last Solid Consumption Time: 1000  Last Liquid Consumption Date: 04/09/24          History Review:  Patient Active Problem List    Diagnosis Date Noted   • Pre-op testing    • Polyp of colon    • Anal pruritus 08/27/2019   • Other chronic otitis externa 05/26/2011       Past Medical History:   Diagnosis Date   • Colon polyps 2020    repeat CLN in 2023   • High cholesterol    • Sleep apnea        Past Surgical History:   Procedure Laterality Date   • COLONOSCOPY N/A 10/23/2019    Procedure: COLONOSCOPY;  Surgeon: RADHA Fraga MD;  Location: [facility] ENDOSCOPY   • COLONOSCOPY N/A 12/2/2020    Procedure: COLONOSCOPY;  Surgeon: RADHA Fraga MD;  Location: [facility] ENDOSCOPY       Medications Prior to Admission   Medication Sig Dispense Refill Last Dose   • PEG 3350-KCl-Na Bicarb-NaCl (TRILYTE) 420 g Oral Recon Soln Take prep as directed by gastro office. May substitute with Trilyte/generic equivalent if needed. 4000 mL 0    • escitalopram 5 MG Oral Tab Take 1 tablet (5 mg total) by mouth daily. TAKING 2.5 MG   4/8/2024 at 2100   • Fluocinolone Acetonide 0.01 % Otic Oil Place 1 drop in ear(s) 2 (two) times a day. 1 each 3    • LORazepam 0.5 MG Oral Tab Take 1 tablet (0.5 mg total) by mouth every 4 (four) hours as needed for Anxiety. PRN   3/19/2024   • Rosuvastatin Calcium 5 MG Oral Tab Take 1 tablet (5 mg total) by mouth. Three times a week  3 4/5/2024   • Cholecalciferol (VITAMIN D) 1000 units Oral Tab Take by mouth.   4/1/2024   • Multiple Vitamins-Minerals (MULTIVITAL) Oral Tab Take 1 tablet  by mouth daily.   2024     Current Facility-Administered Medications Ordered in Epic   Medication Dose Route Frequency Provider Last Rate Last Admin   • lactated ringers infusion   Intravenous Continuous RADHA Fraga MD         No current [facility]-ordered outpatient medications on file.       Allergies   Allergen Reactions   • Penicillins HIVES       Family History   Problem Relation Age of Onset   • Cancer Mother 75        pancreatic (cause of death)   • Heart Disorder Father 75        Congestive heart failure   • Cancer Paternal Grandmother 80        unknown/renal cancer ?     Social History     Socioeconomic History   • Marital status:    Tobacco Use   • Smoking status: Former     Packs/day: 0     Types: Cigarettes     Quit date: 2005     Years since quittin.2   • Smokeless tobacco: Never   • Tobacco comments:     per:NG-0.5 units per day   Vaping Use   • Vaping Use: Never used   Substance and Sexual Activity   • Alcohol use: No   • Drug use: Never   Other Topics Concern   • Caffeine Concern Yes     Comment: coffee-1 cup/day       Available pre-op labs reviewed.             Vital Signs:  Body mass index is 25.42 kg/m².   height is 1.88 m (6' 2\") and weight is 89.8 kg (198 lb). His blood pressure is 126/75 and his pulse is 60. His respiration is 16 and oxygen saturation is 98%.   Vitals:    24 1409 24 0845   BP:  126/75   Pulse:  60   Resp:  16   SpO2:  98%   Weight: 89.8 kg (198 lb)    Height: 1.88 m (6' 2\")         Anesthesia Evaluation     Patient summary reviewed and Nursing notes reviewed    Airway   Mallampati: II  TM distance: >3 FB  Neck ROM: full  Dental      Comment: Teeth appear grossly intact. Patient denies any loose/missing/cracked teeth.      Pulmonary     breath sounds clear to auscultation  (+) sleep apnea on CPAP  Cardiovascular   (-) hypertension, valvular problems/murmurs, past MI, CAD, CABG/stent    Rhythm: regular    Neuro/Psych    (+)   depression    (-) TIA,  CVA    GI/Hepatic/Renal    (+) bowel prep    Endo/Other    Abdominal                Anesthesia Plan:   ASA:  3  Plan:   MAC  Post-op Pain Management: Oral pain medication and IV analgesics  Informed Consent Plan and Risks Discussed With:  Patient and spouse  Discussed plan with:  Surgeon    I have informed Frankie Prescott Erik and/or legal guardian or family member of the nature of the anesthetic plan, benefits, risks including possible dental damage if relevant, major complications, and any alternative forms of anesthetic management.   All of the patient's questions were answered to the best of my ability. The patient desires the anesthetic management as planned.  Christine Benito CRNA  4/9/2024 9:02 AM  Present on Admission:  **None**

## 2024-04-09 NOTE — DISCHARGE INSTRUCTIONS
Home Care Instructions for Colonoscopy with Sedation    Diet:  - Resume your regular diet as tolerated unless otherwise instructed.  - Start with light meals to minimize bloating.  - Do not drink alcohol today.    Medication:  - If you have questions about resuming your normal medications, please contact your Primary Care Physician.    Activities:  - Take it easy today. Do not return to work today.  - Do not drive today.  - Do not operate any machinery today (including kitchen equipment).    Colonoscopy:  - You may notice some rectal \"spotting\" (a little blood on the toilet tissue) for a day or two after the exam. This is normal.  - If you experience any rectal bleeding (not spotting), persistent tenderness or sharp severe abdominal pains, oral temperature over 100 degrees Fahrenheit, light-headedness or dizziness, or any other problems, contact your doctor.    **If unable to reach your doctor, please go to the [facility] Emergency Room**    - Your referring physician will receive a full report of your examination.  - If you do not hear from your doctor's office within two weeks of your biopsy, please call them for your results.    You may be able to see your laboratory results in [email or web address] between 4 and 7 business days.  In some cases, your physician may not have viewed the results before they are released to [email or web address].  If you have questions regarding your results contact the physician who ordered the test/exam by phone or via [facility] by choosing \"Ask a Medical Question.\"

## 2024-04-09 NOTE — ANESTHESIA POSTPROCEDURE EVALUATION
Patient: Frankie Valencia    Procedure Summary       Date: 04/09/24 Room / Location: [facility] ENDOSCOPY 01 / EM ENDOSCOPY    Anesthesia Start: 0909 Anesthesia Stop: 0934    Procedure: COLONOSCOPY Diagnosis:       Personal history of colonic polyps      (colon polyp, hemorrhoids)    Surgeons: RADHA Fraga MD Anesthesiologist: Christine Benito CRNA    Anesthesia Type: MAC ASA Status: 3            Anesthesia Type: MAC    Vitals Value Taken Time   BP 94/65 04/09/24 0934        Pulse 64 04/09/24 0934   Resp 20 04/09/24 0934   SpO2 99 % 04/09/24 0934       [facility] AN Post Evaluation:   Patient Evaluated in PACU  Patient Participation: waiting for patient participation  Level of Consciousness: sleepy but conscious  Pain Management: adequate  Airway Patency:patent  Yes    Nausea/Vomiting: none  Cardiovascular Status: hemodynamically stable  Respiratory Status: acceptable and room air  Postoperative Hydration acceptable      Christine Benito CRNA  4/9/2024 9:34 AM

## 2024-04-09 NOTE — H&P
History & Physical Examination    Patient Name: Frankie Valencia  MRN: [id number]  CSN: [id number]  YOB: 1970    Diagnosis: screening for colon cancer    Medications Prior to Admission   Medication Sig Dispense Refill Last Dose    PEG 3350-KCl-Na Bicarb-NaCl (TRILYTE) 420 g Oral Recon Soln Take prep as directed by gastro office. May substitute with Trilyte/generic equivalent if needed. 4000 mL 0     escitalopram 5 MG Oral Tab Take 1 tablet (5 mg total) by mouth daily. TAKING 2.5 MG   4/8/2024 at 2100    Fluocinolone Acetonide 0.01 % Otic Oil Place 1 drop in ear(s) 2 (two) times a day. 1 each 3     LORazepam 0.5 MG Oral Tab Take 1 tablet (0.5 mg total) by mouth every 4 (four) hours as needed for Anxiety. PRN   3/19/2024    Rosuvastatin Calcium 5 MG Oral Tab Take 1 tablet (5 mg total) by mouth. Three times a week  3 4/5/2024    Cholecalciferol (VITAMIN D) 1000 units Oral Tab Take by mouth.   4/1/2024    Multiple Vitamins-Minerals (MULTIVITAL) Oral Tab Take 1 tablet by mouth daily.   4/1/2024     Current Facility-Administered Medications   Medication Dose Route Frequency    lactated ringers infusion   Intravenous Continuous       Allergies:   Allergies   Allergen Reactions    Penicillins HIVES       Past Medical History:   Diagnosis Date    Colon polyps 2020    repeat CLN in 2023    High cholesterol     Sleep apnea      Past Surgical History:   Procedure Laterality Date    COLONOSCOPY N/A 10/23/2019    Procedure: COLONOSCOPY;  Surgeon: RADHA Fraga MD;  Location: [facility] ENDOSCOPY    COLONOSCOPY N/A 12/2/2020    Procedure: COLONOSCOPY;  Surgeon: RADHA Fraga MD;  Location: [facility] ENDOSCOPY     Family History   Problem Relation Age of Onset    Cancer Mother 75        pancreatic (cause of death)    Heart Disorder Father 75        Congestive heart failure    Cancer Paternal Grandmother 80        unknown/renal cancer ?     Social History     Tobacco Use    Smoking status: Former     Packs/day: 0     Types:  Cigarettes     Quit date: 2005     Years since quittin.2    Smokeless tobacco: Never    Tobacco comments:     per:NG-0.5 units per day   Substance Use Topics    Alcohol use: No       SYSTEM Check if Review is Normal Check if Physical Exam is Normal If not normal, please explain:   HEENT [X ] [ X]    NECK  [X ] [ X]    HEART [X ] [ X]    LUNGS [X ] [ X]    ABDOMEN [X ] [ X]    EXTREMITIES [X ] [ X]    OTHER        I have discussed the risks and benefits and alternatives of the procedure with the patient/family.  They understand and agree to proceed with plan of care.   I have reviewed the History and Physical done within the last 30 days.  Any changes noted above.    BAKARI Fraga MD  [facility] - Gastroenterology  2024  9:12 AM

## 2024-04-24 ENCOUNTER — TELEPHONE (OUTPATIENT)
Dept: GASTROENTEROLOGY | Facility: CLINIC | Age: 54
End: 2024-04-24

## 2024-04-24 NOTE — TELEPHONE ENCOUNTER
I mailed out colonoscopy results letter to pt  Updated health maintenance  Entered into 5 yr CLN recall  Recall colon in 5 years per. Colon done 4/09/2024      RADHA Fraga MD  [facility] Clinical Staff  GI staff: please place recall for colonoscopy in 5 years

## 2024-04-25 ENCOUNTER — APPOINTMENT (OUTPATIENT)
Dept: GENERAL RADIOLOGY | Age: 54
End: 2024-04-25
Attending: NURSE PRACTITIONER
Payer: COMMERCIAL

## 2024-04-25 ENCOUNTER — HOSPITAL ENCOUNTER (OUTPATIENT)
Age: 54
Discharge: HOME | End: 2024-04-25
Payer: COMMERCIAL

## 2024-04-25 VITALS
RESPIRATION RATE: 18 BRPM | OXYGEN SATURATION: 99 % | TEMPERATURE: 98 F | HEART RATE: 67 BPM | DIASTOLIC BLOOD PRESSURE: 70 MMHG | SYSTOLIC BLOOD PRESSURE: 128 MMHG

## 2024-04-25 DIAGNOSIS — S99.919A ANKLE INJURY: ICD-10-CM

## 2024-04-25 DIAGNOSIS — S93.401A SPRAIN OF RIGHT ANKLE, UNSPECIFIED LIGAMENT, INITIAL ENCOUNTER: Primary | ICD-10-CM

## 2024-04-25 PROCEDURE — 73610 X-RAY EXAM OF ANKLE: CPT | Performed by: NURSE PRACTITIONER

## 2024-04-25 PROCEDURE — 99203 OFFICE O/P NEW LOW 30 MIN: CPT | Performed by: NURSE PRACTITIONER

## 2024-04-25 PROCEDURE — L4350 ANKLE CONTROL ORTHO PRE OTS: HCPCS | Performed by: NURSE PRACTITIONER

## 2024-04-25 NOTE — ED PROVIDER NOTES
Patient Seen in: [facility]      History     Chief Complaint   Patient presents with    Ankle Injury     Stated Complaint: ankle injury    Subjective:   HPI  Patient is a 54-year-old male who presents to the immediate care center with concern for pain to the right ankle.  He was playing hockey last evening.  He fell, twisted the ankle.  Is had pain isolated to the ankle since that time.  He is able to ambulate without difficulty, but this does cause worse pain.  He has had no motor or sensory deficit.          Objective:   Past Medical History:    Colon polyps    repeat CLN in 5 years    High cholesterol    Sleep apnea              Past Surgical History:   Procedure Laterality Date    Colonoscopy N/A 10/23/2019    Procedure: COLONOSCOPY;  Surgeon: RADHA Fraga MD;  Location: [facility] ENDOSCOPY    Colonoscopy N/A 2020    Procedure: COLONOSCOPY;  Surgeon: RADHA Fraga MD;  Location: [facility] ENDOSCOPY    Colonoscopy N/A 2024    Procedure: COLONOSCOPY;  Surgeon: RADHA Fraga MD;  Location: [facility] ENDOSCOPY                Social History     Socioeconomic History    Marital status:    Tobacco Use    Smoking status: Former     Current packs/day: 0.00     Types: Cigarettes     Quit date: 2005     Years since quittin.3    Smokeless tobacco: Never    Tobacco comments:     per:NG-0.5 units per day   Vaping Use    Vaping status: Never Used   Substance and Sexual Activity    Alcohol use: No    Drug use: Never   Other Topics Concern    Caffeine Concern Yes     Comment: coffee-1 cup/day     Social Determinants of Health      [facility]    Social Connections    [facility] from [facility]    Housing Stability              Review of Systems   Constitutional: Negative.    Musculoskeletal:  Positive for arthralgias.   Skin:  Negative for wound.   Neurological:  Negative for weakness  and numbness.       Positive for stated complaint: ankle injury  Other systems are as noted in HPI.  Constitutional and vital signs reviewed.      All other systems reviewed and negative except as noted above.    Physical Exam     ED Triage Vitals [04/25/24 1619]   /70   Pulse 67   Resp 18   Temp 97.9 °F (36.6 °C)   Temp src Temporal   SpO2 99 %   O2 Device None (Room air)       Current:/70   Pulse 67   Temp 97.9 °F (36.6 °C) (Temporal)   Resp 18   SpO2 99%         Physical Exam  Vitals and nursing note reviewed.   Constitutional:       General: He is not in acute distress.  Cardiovascular:      Pulses: Normal pulses.   Musculoskeletal:      Right lower leg: No swelling or tenderness. No edema.      Right ankle: No swelling or deformity. Tenderness present. Normal range of motion.      Right foot: No swelling, deformity or tenderness.        Feet:    Skin:     General: Skin is warm and dry.      Findings: No rash.   Neurological:      Mental Status: He is alert and oriented to person, place, and time.   Psychiatric:         Behavior: Behavior normal.               ED Course   Labs Reviewed - No data to display  XR ANKLE (MIN 3 VIEWS), RIGHT (CPT=73610)   Final Result   PROCEDURE: XR ANKLE (MIN 3 VIEWS), RIGHT (CPT=73610)       COMPARISON: None.       INDICATIONS: Right lateral ankle pain x 1 day post twist injury.       TECHNIQUE: 3 views (AP, mortise, and lateral projections) were obtained.         FINDINGS:    BONES: No significant arthropathy, fracture, or acute abnormality.  The    tibiotalar joint space is maintained on the mortise projection.   SOFT TISSUES: There is soft tissue swelling most prominent over the    lateral malleolus.   EFFUSION: There is a small ankle effusion.   OTHER: Negative.                     =====   CONCLUSION:    Soft tissue swelling with ankle effusion. No definite osseous abnormality.    Findings are compatible with acute right ankle sprain.       Dictated by (CST):  Silas Zaragoza MD on 4/25/2024 at 4:34 PM        Finalized by (CST): Silas Zaragoza MD on 4/25/2024 at 4:34 PM                 Ankle splint tech; post application: good alignment; n/v intact.  Patient declined offer for crutches, stating he has some at home.                   MDM      Reviewed results of xray; discussed that despite no obvious fx/dislocation, there is potential for serious ligamentous injury that could possibly require intervention. Pt was advised that they MUST f/u 5-7 days if pain persists for further evaluation and treatment.                                      Medical Decision Making  Differential diagnoses considered today include, but are not exclusive of: fracture, dislocation, strain, sprain, vascular compromise, and nerve impingement syndrome.      Problems Addressed:  Sprain of right ankle, unspecified ligament, initial encounter: self-limited or minor problem    Amount and/or Complexity of Data Reviewed  Radiology: independent interpretation performed.     Details: No fracture or dislocation.    Risk  OTC drugs.        Disposition and Plan     Clinical Impression:  1. Sprain of right ankle, unspecified ligament, initial encounter    2. Ankle injury         Disposition:  Discharge  4/25/2024  4:41 pm    Follow-up:  Weiler, Colleen M, DO  [address]  [facility] [address]  [phone]    Schedule an appointment as soon as possible for a visit in 1 week  As needed          Medications Prescribed:  Current Discharge Medication List

## 2024-04-25 NOTE — ED INITIAL ASSESSMENT (HPI)
Pt was playing hockey last night and injured right ankle. Swelling noted. Pain with weight bearing.

## 2024-07-31 RX ORDER — FLUOCINOLONE ACETONIDE 0.11 MG/ML
1 OIL AURICULAR (OTIC) 2 TIMES DAILY
Qty: 1 EACH | Refills: 3 | Status: SHIPPED | OUTPATIENT
Start: 2024-07-31

## 2024-07-31 NOTE — TELEPHONE ENCOUNTER
Please review. Protocol Failed; No Protocol    Requested Prescriptions   Pending Prescriptions Disp Refills    Fluocinolone Acetonide 0.01 % Otic Oil 1 each 3     Sig: Place 1 drop in ear(s) in the morning and 1 drop before bedtime.       There is no refill protocol information for this order              Recent Outpatient Visits              10 months ago Piriformis syndrome of right side    [facility], [facility] Weiler, Colleen M, DO    Office Visit    1 year ago Bilateral impacted cerumen    [facility], [facility] Deuce Adam MD    Office Visit    1 year ago Routine physical examination    [facility] Weiler, Colleen M, DO    Office Visit    4 years ago Anal pruritus    [facility] RADHA Fraga MD    Office Visit    5 years ago Routine physical examination    [facility], [facility] Weiler, Colleen M, DO    Office Visit

## 2025-07-21 ENCOUNTER — OFFICE VISIT (OUTPATIENT)
Dept: OTOLARYNGOLOGY | Facility: CLINIC | Age: 55
End: 2025-07-21

## 2025-07-21 DIAGNOSIS — H61.23 BILATERAL IMPACTED CERUMEN: Primary | ICD-10-CM

## 2025-07-21 RX ORDER — FLUOCINOLONE ACETONIDE 0.11 MG/ML
1 OIL AURICULAR (OTIC) 2 TIMES DAILY
Qty: 1 EACH | Refills: 3 | Status: SHIPPED | OUTPATIENT
Start: 2025-07-21

## 2025-07-21 NOTE — PROGRESS NOTES
Frankie Valencia is a 55 year old male.    Chief Complaint   Patient presents with    Ear Wax     Ear cleaning        HISTORY OF PRESENT ILLNESS  2/23 He presents with a history of eczema of the ear canals seen by [facility] who is no longer working in the area.  Looking for a new ENT uses [facility] daily.  Patient presents for cerumen removal. No other complaints or concerns at this time     7/21/25 Patient presents for cerumen removal.  Having difficulty hearing out of his ears bilaterally.  Does have issues with eczema and uses fluocinolone no other complaints or concerns at this time    Social Hx on file[1]    Family History[2]    Past Medical History[3]    Past Surgical History[4]    REVIEW OF SYSTEMS    System Neg/Pos Details   Constitutional Negative Fatigue, fever and weight loss.   ENMT Negative Drooling.   Eyes Negative Blurred vision and vision changes.   Respiratory Negative Dyspnea and wheezing.   Cardio Negative Chest pain, irregular heartbeat/palpitations and syncope.   GI Negative Abdominal pain and diarrhea.   Endocrine Negative Cold intolerance and heat intolerance.   Neuro Negative Tremors.   Psych Negative Anxiety and depression.   Integumentary Negative Frequent skin infections, pigment change and rash.   Hema/Lymph Negative Easy bleeding and easy bruising.           PHYSICAL EXAM    There were no vitals taken for this visit.       Constitutional Normal Overall appearance - Normal.        Neck Exam Normal Inspection - Normal. Palpation - Normal. Parotid gland - Normal. Thyroid gland - Normal.             Head/Face Normal Facial features - Normal. Eyebrows - Normal. Skull - Normal.             Ears Normal Inspection - Right: Normal, Left: Normal. Canal - Right: Normal, Left: Normal. TM - Right: Normal, Left: Normal.   Skin Normal Inspection - Normal.                              Canals:  Right: Canal reveals cerumen impaction,   Left: Canal reveals cerumen impaction,     Tympanic Membranes:  Right:  Normal tympanic membrane.   Left: Normal tympanic membrane.     TM Visualized Method:   Right TM examined via otomicroscopy.    Left TM examined via otomicroscopy.      PROCEDURE:    Removal of cerumen impaction   The cerumen impaction was completely removed using microscopy.   Removal was completed by using acurette and/or suction.   Comments: Return to clinic as needed.  Avoid q-tips, water precautions and use over the counter wax remedies as needed.    Medications - Current[5]  ASSESSMENT AND PLAN    1. Bilateral impacted cerumen  Ears cleaned of cerumen impaction bilaterally.  Fluocinolone refilled.      All cerumen was removed using microscopy. I have asked the patient to return to see me as needed for repeat cerumen removal in the future.      Deuce Adam MD    2025    5:09 PM           [1]   Social History  Socioeconomic History    Marital status:    Tobacco Use    Smoking status: Former     Current packs/day: 0.00     Types: Cigarettes     Quit date: 2005     Years since quittin.5    Smokeless tobacco: Never    Tobacco comments:     per:NG-0.5 units per day   Vaping Use    Vaping status: Never Used   Substance and Sexual Activity    Alcohol use: No    Drug use: Never   Other Topics Concern    Caffeine Concern Yes     Comment: coffee-1 cup/day   [2]   Family History  Problem Relation Age of Onset    Cancer Mother 75        pancreatic (cause of death)    Heart Disorder Father 75        Congestive heart failure    Cancer Paternal Grandmother 80        unknown/renal cancer ?   [3]   Past Medical History:   Colon polyps    repeat CLN in 5 years    High cholesterol    Sleep apnea   [4]   Past Surgical History:  Procedure Laterality Date    Colonoscopy N/A 10/23/2019    Procedure: COLONOSCOPY;  Surgeon: RADHA Fraga MD;  Location: [facility] ENDOSCOPY    Colonoscopy N/A 2020    Procedure: COLONOSCOPY;  Surgeon: RADHA Fraga MD;  Location: [facility] ENDOSCOPY    Colonoscopy N/A 2024     Procedure: COLONOSCOPY;  Surgeon: RADHA Fraga MD;  Location: [facility] ENDOSCOPY   [5]   Current Outpatient Medications:     Fluocinolone Acetonide 0.01 % Otic Oil, Place 1 drop in ear(s) in the morning and 1 drop before bedtime., Disp: 1 each, Rfl: 3    escitalopram 5 MG Oral Tab, Take 1 tablet (5 mg total) by mouth daily. TAKING 2.5 MG, Disp: , Rfl:     LORazepam 0.5 MG Oral Tab, Take 1 tablet (0.5 mg total) by mouth every 4 (four) hours as needed for Anxiety. PRN, Disp: , Rfl:     Rosuvastatin Calcium 5 MG Oral Tab, Take 1 tablet (5 mg total) by mouth. Three times a week, Disp: , Rfl: 3    Cholecalciferol (VITAMIN D) 1000 units Oral Tab, Take by mouth., Disp: , Rfl:     Multiple Vitamins-Minerals (MULTIVITAL) Oral Tab, Take 1 tablet by mouth daily., Disp: , Rfl:

## (undated) DEVICE — KIT CLEAN ENDOKIT 1.1OZ GOWNX2

## (undated) DEVICE — SNARE 9MM 230CM 2.4MM EXACTO

## (undated) DEVICE — 60 ML SYRINGE REGULAR TIP: Brand: MONOJECT

## (undated) DEVICE — MASK PROC W/VISOR ANTIGLARE

## (undated) DEVICE — MEDI-VAC NON-CONDUCTIVE SUCTION TUBING 6MM X 1.8M (6FT.) L: Brand: CARDINAL HEALTH

## (undated) DEVICE — Device: Brand: DUAL NARE NASAL CANNULAE FEMALE LUER CON 7FT O2 TUBE

## (undated) DEVICE — KIT ENDO ORCAPOD 160/180/190

## (undated) DEVICE — Device: Brand: CUSTOM PROCEDURE KIT

## (undated) DEVICE — TRAP MCS 40ML 5IN PLS SCR CAP

## (undated) DEVICE — LINE MNTR ADLT SET O2 INTMD

## (undated) DEVICE — 35 ML SYRINGE REGULAR TIP: Brand: MONOJECT

## (undated) DEVICE — SNARE ENDOSCOPIC 10MM ROUND

## (undated) DEVICE — TRAP POLYP W/ 2 SPEC TY CLR MAGNIFYING WIND

## (undated) DEVICE — SNARE OPTMZ PLPCTM TRP

## (undated) NOTE — LETTER
[facility], [address] [place] [address], [facility]  [id number]  [facility] [place] [id number] [phone]                10/29/19      Jonathan [id number]        Dear Cheryl Porter,    I reviewed the pathology report from the

## (undated) NOTE — LETTER
[facility]  [address], [place], [place]  Authorization for Surgical Operation and Procedure                                                                                           I hereby authorize RADHA Fraga MD, my physician and his/her assistants (if applicable), which may include medical students, residents, and/or fellows, to perform the following surgical operation/ procedure and administer such anesthesia as may be determined necessary by my physician: Operation/Procedure name (s) COLONOSCOPY on Frankie Valencia   2.   I recognize that during the surgical operation/procedure, unforeseen conditions may necessitate additional or different procedures than those listed above.  I, therefore, further authorize and request that the above-named surgeon, assistants, or designees perform such procedures as are, in their judgment, necessary and desirable.    3.   My surgeon/physician has discussed prior to my surgery the potential benefits, risks and side effects of this procedure; the likelihood of achieving goals; and potential problems that might occur during recuperation.  They also discussed reasonable alternatives to the procedure, including risks, benefits, and side effects related to the alternatives and risks related to not receiving this procedure.  I have had all my questions answered and I acknowledge that no guarantee has been made as to the result that may be obtained.    4.   Should the need arise during my operation/procedure, which includes change of level of care prior to discharge, I also consent to the administration of blood and/or blood products.  Further, I understand that despite careful testing and screening of blood or blood products by collecting agencies, I may still be subject to ill effects as a result of receiving a blood transfusion and/or blood products.  The following are some, but not all, of the potential risks that can occur: fever and allergic  reactions, hemolytic reactions, transmission of diseases such as Hepatitis, AIDS and Cytomegalovirus (CMV) and fluid overload.  In the event that I wish to have an autologous transfusion of my own blood, or a directed donor transfusion, I will discuss this with my physician.  Check only if Refusing Blood or Blood Products  I understand refusal of blood or blood products as deemed necessary by my physician may have serious consequences to my condition to include possible death. I hereby assume responsibility for my refusal and release the hospital, its personnel, and my physicians from any responsibility for the consequences of my refusal.    o  Refuse   5.   I authorize the use of any specimen, organs, tissues, body parts or foreign objects that may be removed from my body during the operation/procedure for diagnosis, research or teaching purposes and their subsequent disposal by hospital authorities.  I also authorize the release of specimen test results and/or written reports to my treating physician on the hospital medical staff or other referring or consulting physicians involved in my care, at the discretion of the Pathologist or my treating physician.    6.   I consent to the photographing or videotaping of the operations or procedures to be performed, including appropriate portions of my body for medical, scientific, or educational purposes, provided my identity is not revealed by the pictures or by descriptive texts accompanying them.  If the procedure has been photographed/videotaped, the surgeon will obtain the original picture, image, videotape or CD.  The hospital will not be responsible for storage, release or maintenance of the picture, image, tape or CD.    7.   I consent to the presence of a  or observers in the operating room as deemed necessary by my physician or their designees.    8.   I recognize that in the event my procedure results in extended X-Ray/fluoroscopy time, I may  develop a skin reaction.    9. If I have a Do Not Attempt Resuscitation (DNAR) order in place, that status will be suspended while in the operating room, procedural suite, and during the recovery period unless otherwise explicitly stated by me (or a person authorized to consent on my behalf). The surgeon or my attending physician will determine when the applicable recovery period ends for purposes of reinstating the DNAR order.  10. Patients having a sterilization procedure: I understand that if the procedure is successful the results will be permanent and it will therefore be impossible for me to inseminate, conceive, or bear children.  I also understand that the procedure is intended to result in sterility, although the result has not been guaranteed.   11. I acknowledge that my physician has explained sedation/analgesia administration to me including the risk and benefits I consent to the administration of sedation/analgesia as may be necessary or desirable in the judgment of my physician.    I CERTIFY THAT I HAVE READ AND FULLY UNDERSTAND THE ABOVE CONSENT TO OPERATION and/or OTHER PROCEDURE.     _________________________________________ _________________________________     ___________________________________  Signature of Patient     Signature of Responsible Person                   Printed Name of Responsible Person                              _________________________________________ ______________________________        ___________________________________  Signature of Witness         Date  Time         Relationship to Patient    STATEMENT OF PHYSICIAN My signature below affirms that prior to the time of the procedure; I have explained to the patient and/or his/her legal representative, the risks and benefits involved in the proposed treatment and any reasonable alternative to the proposed treatment. I have also explained the risks and benefits involved in refusal of the proposed treatment and alternatives  to the proposed treatment and have answered the patient's questions. If I have a significant financial interest in a co-management agreement or a significant financial interest in any product or implant, or other significant relationship used in this procedure/surgery, I have disclosed this and had a discussion with my patient.     _______________________________________________________________ _____________________________  (Signature of Physician)                                                                                         (Date)                                   (Time)  Patient Name: Frankie Valencia    : 3/11/1970   Printed: 4/3/2024      Medical Record #: [id number]                                              Page 1 of 1

## (undated) NOTE — LETTER
6/27/2019              [facility] [id number]         To whom it may concern,      Austin Ribeiro was een in the office today for routine physical and is healthy. Please feel free to call with questions.        Sincerely,

## (undated) NOTE — LETTER
8/25/2020    [address] [place] [id number]            Dear Giancarlo Mercer,      Our records indicate that you are due for an appointment for a Colonoscopy with Ramy Calvert MD.    Please call our office to schedule this

## (undated) NOTE — LETTER
[facility]  Administration of Anesthesia  IFrankie agree to be cared for by a physician anesthesiologist alone and/or with a nurse anesthetist, who is specially trained to monitor me and give me medicine to put me to sleep or keep me comfortable during my procedure    I understand that my anesthesiologist and/or anesthetist is not an employee or agent of [facility] or [employer]. He or she works for [facility]    As the patient asking for anesthesia services, I agree to:  Allow the anesthesiologist (anesthesia doctor) to give me medicine and do additional procedures as necessary. Some examples are: Starting or using an “IV” to give me medicine, fluids or blood during my procedure, and having a breathing tube placed to help me breathe when I’m asleep (intubation). In the event that my heart stops working properly, I understand that my anesthesiologist will make every effort to sustain my life, unless otherwise directed by [facility] Do Not Resuscitate documents.  Tell my anesthesia doctor before my procedure:  If I am pregnant.  The last time that I ate or drank.  iii. All of the medicines I take (including prescriptions, herbal supplements, and pills I can buy without a prescription (including street drugs/illegal medications). Failure to inform my anesthesiologist about these medicines may increase my risk of anesthetic complications.  iv.If I am allergic to anything or have had a reaction to anesthesia before.  I understand how the anesthesia medicine will help me (benefits).  I understand that with any type of anesthesia medicine there are risks:  The most common risks are: nausea, vomiting, sore throat, muscle soreness, damage to my eyes, mouth, or teeth (from breathing tube placement).  Rare risks include: remembering what happened during my procedure, allergic reactions to medications, injury to my airway, heart, lungs, vision, nerves, or  muscles and in extremely rare instances death.  My doctor has explained to me other choices available to me for my care (alternatives).  Pregnant Patients (“epidural”):  I understand that the risks of having an epidural (medicine given into my back to help control pain during labor), include itching, low blood pressure, difficulty urinating, headache or slowing of the baby’s heart. Very rare risks include infection, bleeding, seizure, irregular heart rhythms and nerve injury.  Regional Anesthesia (“spinal”, “epidural”, & “nerve blocks”):  I understand that rare but potential complications include headache, bleeding, infection, seizure, irregular heart rhythms, and nerve injury.    _____________________________________________________________________________  Patient (or Representative) Signature/Relationship to Patient  Date   Time    _____________________________________________________________________________   Name (if used)    Language/Organization   Time    _____________________________________________________________________________  Nurse Anesthetist Signature     Date   Time  _____________________________________________________________________________  Anesthesiologist Signature     Date   Time  I have discussed the procedure and information above with the patient (or patient’s representative) and answered their questions. The patient or their representative has agreed to have anesthesia services.    _____________________________________________________________________________  Witness        Date   Time  I have verified that the signature is that of the patient or patient’s representative, and that it was signed before the procedure  Patient Name: Frankie Valencia     : 3/11/1970                 Printed: 4/3/2024 at 11:29 AM    Medical Record #: [id number]                                            Page 1 of 1  ----------ANESTHESIA CONSENT----------